# Patient Record
Sex: FEMALE | Race: WHITE
[De-identification: names, ages, dates, MRNs, and addresses within clinical notes are randomized per-mention and may not be internally consistent; named-entity substitution may affect disease eponyms.]

---

## 2019-02-25 ENCOUNTER — HOSPITAL ENCOUNTER (OUTPATIENT)
Dept: HOSPITAL 47 - RADBDWWP | Age: 74
Discharge: HOME | End: 2019-02-25
Attending: OBSTETRICS & GYNECOLOGY
Payer: MEDICARE

## 2019-02-25 DIAGNOSIS — Z13.820: Primary | ICD-10-CM

## 2019-02-25 PROCEDURE — 77080 DXA BONE DENSITY AXIAL: CPT

## 2019-02-25 NOTE — BD
EXAMINATION TYPE: Axial Bone Density

 

DATE OF EXAM: 2/25/2019

 

COMPARISON: NONE

 

CLINICAL HISTORY:

 

Height:  213.9

Weight:  63

 

FRAX RISK QUESTIONS:

Alcohol (3 or more units per day):  no

Family History (Parent hip fracture):  yes mother

Glucocorticoids (More than 3mos):  no

           (Ex: prednisone, prednisolone, methylprednisolone, dexamethasone, and hydrocortisone).    
     

History of Fracture in Adulthood: no

Secondary Osteoporosis:

  1.  Type 1 Diabetes: no

  2.  Hyperthyroidism: no

  3.  Menopause before 45: no

  4.  Malnutrition: no

  5.  Chronic liver disease: no

Rheumatoid Arthritis: no

Current Tobacco Use: no

 

RISK FACTORS 

HISTORY OF: 

Family History of Osteoporosis: yes mother

Active: yes

Diet low in dairy products/other sources of calcium:  no

Postmenopausal woman: age 47

Lost more than 2 inches in height since high school: no

 

MEDICATIONS:hyluronic, bluebomet 

 

 

Additional History:

 

 

EXAM MEASUREMENTS: 

Bone mineral densitometry was performed using the [employer] System.

Bone mineral density as measured about the Lumbar spine is:  

----- L1-L4(G/cm2): 1.350

T Score Values are as follows:

----- L2: 1.0

----- L3: 2.4

----- L4: 1.8

----- L1-L4: 1.4

Bone mineral density has: increased 6.3 % since study of: 12.19.2016

 

Bone mineral density about the R hip (g/cm2): 0.927

Bone mineral density about the L hip (g/cm2): 0.890

T Score values are as follows:

-----R Neck: -0.8

-----L Neck: -1.1

-----R Total: 0.1

-----L Total: 0.0

Bone mineral density has: decreased -0.5 % since study of: 12.19.2016

 

 

IMPRESSION:

Normal (Values between +1 and -1 indicate normal bone mass).  Consider repeating this study in 5 year
s or sooner if there is some new clinical indication.

 

 

 

 

 

NOTE:  T-SCORE=SD OF THE YOUNG ADULT MEAN.

## 2023-03-29 ENCOUNTER — HOSPITAL ENCOUNTER (OUTPATIENT)
Dept: HOSPITAL 47 - RADBDWWP | Age: 78
Discharge: HOME | End: 2023-03-29
Attending: FAMILY MEDICINE
Payer: MEDICARE

## 2023-03-29 DIAGNOSIS — Z78.0: ICD-10-CM

## 2023-03-29 DIAGNOSIS — M85.89: Primary | ICD-10-CM

## 2023-03-29 PROCEDURE — 77080 DXA BONE DENSITY AXIAL: CPT

## 2023-03-29 NOTE — BD
EXAMINATION TYPE: Axial Bone Density

 

DATE OF EXAM: 3/29/2023

 

CLINICAL HISTORY: 77 years old Female.  ICD-10 CODE: Z78.0 [place]

 

Height:  5 ft 2 1/2 in

Weight:  200

 

FRAX RISK QUESTIONS:

Alcohol (3 or more units per day):  no

Family History (Parent hip fracture):  yes

Glucocorticoids (More than 3mos):  no

           (Ex: prednisone, prednisolone, methylprednisolone, dexamethasone, and hydrocortisone).    
     

History of Fracture in Adulthood: no

Secondary Osteoporosis:

  1.  Type 1 Diabetes: no

  2.  Hyperthyroidism: no

  3.  Menopause before 45: no

  4.  Malnutrition: no

  5.  Chronic liver disease: no

Rheumatoid Arthritis: no

Current Tobacco Use: no

 

RISK FACTORS 

HISTORY OF: 

Surgery to Spine/Hip(right/left)/Wrist (right/left): no

Family History of Osteoporosis: yes

Active: yes

Diet low in dairy products/other sources of calcium:  no

Postmenopausal woman: yes

Take estrogen and/or progesterone medications: no

Lost more than 2 inches in height since high school: no

Frequent falls: no

Poor Health: good

Hyperparathyroidism: no

Adrenal Insufficiency: no

 

MEDICATIONS: 

Additional Medications: none 

 

 

Additional History:

 

 

EXAM MEASUREMENTS: 

Bone mineral densitometry was performed using the [email or web address] System.

Bone mineral density as measured about the Lumbar spine is:  

----- L1-L4(G/cm2): 1.375

T Score Values are as follows:

----- L1: 1.5

----- L2: 1.9

----- L3: 2.2

----- L4: 0.7

----- L1-L4: 1.6

 

Z Score Values are as follows:

----- L1: 2.5

----- L2: 2.9

----- L3: 3.1

----- L4: 1.6

----- L1-L4: 2.6

 

Bone mineral density has: increased  6.9 % since study of: 2016

 

Bone mineral density about the R hip (g/cm2): 0.863

Bone mineral density about the L hip (g/cm2): 0.846

T Score values are as follows:

-----R Neck: -1.3

-----L Neck: -1.4

-----R Total: -0.3

-----L Total: -0.3

 

Z Score values are as follows:

-----R Neck: 0.2

-----L Neck: 0.1

-----R Total: 1.0

-----L Total: 0.9

 

Bone mineral density has: decreased  -4.3 % since study of: 2016

 

 

FRAX%s: The graph provided illustrates a 18.5 % chance for a major osteoporotic fx and a 9.3 % chance
 for the hips probability for fx in 10 years time.

 

 

 

 

IMPRESSION:

Osteopenia (T Score between -2.5 and -1).

 

There is slightly increased risk of fracture and the patient may be considered 

for treatment. 

 

Re-Screen 2-5 years.

 

NOTE:  T-SCORE=SD OF THE YOUNG ADULT MEAN.